# Patient Record
Sex: MALE | Race: WHITE | NOT HISPANIC OR LATINO | URBAN - METROPOLITAN AREA
[De-identification: names, ages, dates, MRNs, and addresses within clinical notes are randomized per-mention and may not be internally consistent; named-entity substitution may affect disease eponyms.]

---

## 2019-03-25 ENCOUNTER — OFFICE (OUTPATIENT)
Dept: URBAN - METROPOLITAN AREA CLINIC 11 | Facility: CLINIC | Age: 49
End: 2019-03-25

## 2019-03-25 VITALS
DIASTOLIC BLOOD PRESSURE: 82 MMHG | SYSTOLIC BLOOD PRESSURE: 121 MMHG | HEIGHT: 70 IN | WEIGHT: 202 LBS | HEART RATE: 80 BPM

## 2019-03-25 DIAGNOSIS — R16.0 HEPATOMEGALY, NOT ELSEWHERE CLASSIFIED: ICD-10-CM

## 2019-03-25 DIAGNOSIS — K74.3 PRIMARY BILIARY CIRRHOSIS: ICD-10-CM

## 2019-03-25 DIAGNOSIS — K75.4 AUTOIMMUNE HEPATITIS: ICD-10-CM

## 2019-03-25 DIAGNOSIS — R74.0 NONSPECIFIC ELEVATION OF LEVELS OF TRANSAMINASE AND LACTIC A: ICD-10-CM

## 2019-03-25 LAB
ACTIN (SMOOTH MUSCLE) ANTIBODY: 11 UNITS (ref 0–19)
AMMONIA, PLASMA: 75 UG/DL (ref 27–102)
ANTINUCLEAR ANTIBODIES, IFA: POSITIVE
CBC WITH DIFFERENTIAL/PLATELET: BASO (ABSOLUTE): 0 X10E3/UL (ref 0–0.2)
CBC WITH DIFFERENTIAL/PLATELET: BASOS: 0 %
CBC WITH DIFFERENTIAL/PLATELET: EOS (ABSOLUTE): 0.1 X10E3/UL (ref 0–0.4)
CBC WITH DIFFERENTIAL/PLATELET: EOS: 1 %
CBC WITH DIFFERENTIAL/PLATELET: HEMATOCRIT: 39.8 % (ref 37.5–51)
CBC WITH DIFFERENTIAL/PLATELET: HEMATOLOGY COMMENTS: (no result)
CBC WITH DIFFERENTIAL/PLATELET: HEMOGLOBIN: 14.6 G/DL (ref 13–17.7)
CBC WITH DIFFERENTIAL/PLATELET: IMMATURE CELLS: (no result)
CBC WITH DIFFERENTIAL/PLATELET: LYMPHS (ABSOLUTE): 0.5 X10E3/UL — LOW (ref 0.7–3.1)
CBC WITH DIFFERENTIAL/PLATELET: LYMPHS: 4 %
CBC WITH DIFFERENTIAL/PLATELET: MCH: 29.6 PG (ref 26.6–33)
CBC WITH DIFFERENTIAL/PLATELET: MCHC: 36.7 G/DL — HIGH (ref 31.5–35.7)
CBC WITH DIFFERENTIAL/PLATELET: MCV: 81 FL (ref 79–97)
CBC WITH DIFFERENTIAL/PLATELET: MONOCYTES(ABSOLUTE): 0.5 X10E3/UL (ref 0.1–0.9)
CBC WITH DIFFERENTIAL/PLATELET: MONOCYTES: 4 %
CBC WITH DIFFERENTIAL/PLATELET: NEUTROPHILS (ABSOLUTE): 10.7 X10E3/UL — HIGH (ref 1.4–7)
CBC WITH DIFFERENTIAL/PLATELET: NEUTROPHILS: 88 %
CBC WITH DIFFERENTIAL/PLATELET: PLATELETS: 147 X10E3/UL — LOW (ref 150–379)
CBC WITH DIFFERENTIAL/PLATELET: RBC: 4.94 X10E6/UL (ref 4.14–5.8)
CBC WITH DIFFERENTIAL/PLATELET: RDW: 18.6 % — HIGH (ref 12.3–15.4)
CBC WITH DIFFERENTIAL/PLATELET: WBC: 11.9 X10E3/UL — HIGH (ref 3.4–10.8)
CMV QUANT DNA PCR (PLASMA): LOG10 CMV QN DNA PL: (no result) LOG10 IU/ML
CMV QUANT DNA PCR (PLASMA): NEGATIVE IU/ML
FACTOR V ACTIVITY: 124 % (ref 70–150)
FANA STAINING PATTERNS: HOMOGENEOUS PATTERN: HIGH
FANA STAINING PATTERNS: NOTE: (no result)
FE+TIBC+FER: FERRITIN, SERUM: 1462 NG/ML — HIGH (ref 30–400)
FE+TIBC+FER: IRON BIND.CAP.(TIBC): <195 UG/DL — LOW
FE+TIBC+FER: IRON SATURATION: >91 % — CRITICAL HIGH
FE+TIBC+FER: IRON: 178 UG/DL — HIGH (ref 38–169)
FE+TIBC+FER: UIBC: <17 UG/DL — LOW
HBV REAL-TIME PCR, QUANT: HBV IU/ML: (no result) IU/ML
HBV REAL-TIME PCR, QUANT: LOG10 HBV IU/ML: (no result) LOG10 IU/ML
HBV REAL-TIME PCR, QUANT: TEST INFORMATION: (no result)
HCV RT-PCR, QUANT (NON-GRAPH): HEPATITIS C QUANTITATION: (no result) IU/ML
HCV RT-PCR, QUANT (NON-GRAPH): TEST INFORMATION: (no result)
HEP A AB, TOTAL: POSITIVE
HEPATIC FUNCTION PANEL (7): ALBUMIN: 3.2 G/DL — LOW (ref 3.5–5.5)
HEPATIC FUNCTION PANEL (7): ALKALINE PHOSPHATASE: 360 IU/L — HIGH (ref 39–117)
HEPATIC FUNCTION PANEL (7): ALT (SGPT): 123 IU/L — HIGH (ref 0–44)
HEPATIC FUNCTION PANEL (7): AST (SGOT): 81 IU/L — HIGH (ref 0–40)
HEPATIC FUNCTION PANEL (7): BILIRUBIN, DIRECT: 30.38 MG/DL — CRITICAL HIGH (ref 0–0.4)
HEPATIC FUNCTION PANEL (7): BILIRUBIN, TOTAL: 32.2 MG/DL (ref 0–1.2)
HEPATIC FUNCTION PANEL (7): PROTEIN, TOTAL: 5.2 G/DL — LOW (ref 6–8.5)
HEPATITIS E VIRUS (HEV) IGG/M: HEV IGG: NEGATIVE
HEPATITIS E VIRUS (HEV) IGG/M: HEV IGM: NEGATIVE
HSV 1/2 PCR: HSV-1 DNA: POSITIVE
HSV 1/2 PCR: HSV-2 DNA: NEGATIVE
IMMATURE CELLS: BANDS: 2 %
IMMATURE CELLS: METAMYELOCYTES: 1 % — HIGH (ref 0–0)
LIVER-KIDNEY MICROSOMAL AB: 3.2 UNITS (ref 0–20)
Lab: (no result) LOG10COPY/ML
Lab: NEGATIVE COPIES/ML
MITOCHONDRIAL (M2) ANTIBODY: <20 UNITS
PROTHROMBIN TIME (PT): INR: 1.1 (ref 0.8–1.2)
PROTHROMBIN TIME (PT): PROTHROMBIN TIME: 12.1 SEC — HIGH (ref 9.1–12)

## 2019-03-25 PROCEDURE — 99205 OFFICE O/P NEW HI 60 MIN: CPT | Performed by: INTERNAL MEDICINE

## 2019-03-25 RX ORDER — RIFAXIMIN 550 MG/1
1100 TABLET ORAL
Qty: 60 | Refills: 3 | Status: ACTIVE
Start: 2019-03-25

## 2019-03-25 NOTE — SERVICEHPINOTES
He presents for evaluation of a recently liver mass and abnormal LFTS. He has a history of Autoimmune hepatitis with featuers of PSCS and some copper deposition in 2010.  He started having dark urine in January and then became jaundiced at the end of February.  He has not had abdominal pain but has become quite icteric (bili in the upper 20s). He has not had abdominal pain, fevers or chills.  He has not had confusion issues but has been fatigued (increased over the past couple of days.  He has had some itching issues and has tried topical benadryl without much improvement. He has not been on OTC meds other than melatonin.  He has not been taking supplements, herbals, speical teas or such.He was admitted for a liver bx a few weeks ago. It was read as as cholestasis.  His MRI revealed atrophy of the left lobe of the liver and a 6cm mass in section 1. This was read as suggestive of a metastatic change.  He has a bx scheduled for Wednesday.  He has a f/u PET scan.  He had been on Chuck in 2015.  He was on prednisone for several years initially and then changed to Imuran.  His meds were stopped last year in June.  He had not been on Chuck for about three years.

## 2019-03-25 NOTE — SERVICENOTES
We reivewed his recent bx, labs, and meds. He has the hx of autoimmune hepatitis with PSCS overlap and I am quite concerned about a progression of the PSC (more than autoimmune hepatitis) given the LFT pattern.  We discussed progression to liver failure and overall risks.  I would contiue the prednisone, SHANI, and Imuran (noting risks of recurrent cancer and also progression of a cancer if present).  He will need f/u serologies and to have his liver bx over read by a liver pathologist.  With his lethargy, I will check his labs and add Xifaxan.  We discussed sx/sx of worsening liver disease and issues that would prompt an ER visit.  We also discussed possible liver transplant options/surgical options with the noted mass.

## 2019-04-01 ENCOUNTER — OFFICE (OUTPATIENT)
Dept: URBAN - METROPOLITAN AREA CLINIC 11 | Facility: CLINIC | Age: 49
End: 2019-04-01

## 2019-04-01 VITALS
HEART RATE: 90 BPM | HEIGHT: 70 IN | WEIGHT: 198 LBS | DIASTOLIC BLOOD PRESSURE: 75 MMHG | SYSTOLIC BLOOD PRESSURE: 112 MMHG

## 2019-04-01 DIAGNOSIS — K83.01 PRIMARY SCLEROSING CHOLANGITIS: ICD-10-CM

## 2019-04-01 DIAGNOSIS — K75.4 AUTOIMMUNE HEPATITIS: ICD-10-CM

## 2019-04-01 DIAGNOSIS — C22.1 INTRAHEPATIC BILE DUCT CARCINOMA: ICD-10-CM

## 2019-04-01 DIAGNOSIS — E83.110 HEREDITARY HEMOCHROMATOSIS: ICD-10-CM

## 2019-04-01 LAB
COMP. METABOLIC PANEL (14): A/G RATIO: 1.5 (ref 1.2–2.2)
COMP. METABOLIC PANEL (14): ALBUMIN: 3.1 G/DL — LOW (ref 3.5–5.5)
COMP. METABOLIC PANEL (14): ALKALINE PHOSPHATASE: 372 IU/L — HIGH (ref 39–117)
COMP. METABOLIC PANEL (14): ALT (SGPT): 150 IU/L — HIGH (ref 0–44)
COMP. METABOLIC PANEL (14): AST (SGOT): 81 IU/L — HIGH (ref 0–40)
COMP. METABOLIC PANEL (14): BILIRUBIN, TOTAL: 31.4 MG/DL (ref 0–1.2)
COMP. METABOLIC PANEL (14): BUN/CREATININE RATIO: 26 — HIGH (ref 9–20)
COMP. METABOLIC PANEL (14): BUN: 49 MG/DL — HIGH (ref 6–24)
COMP. METABOLIC PANEL (14): CALCIUM: 9.4 MG/DL (ref 8.7–10.2)
COMP. METABOLIC PANEL (14): CARBON DIOXIDE, TOTAL: 25 MMOL/L (ref 20–29)
COMP. METABOLIC PANEL (14): CHLORIDE: 101 MMOL/L (ref 96–106)
COMP. METABOLIC PANEL (14): CREATININE: 1.91 MG/DL — HIGH (ref 0.76–1.27)
COMP. METABOLIC PANEL (14): EGFR IF AFRICN AM: 47 ML/MIN/1.73 — LOW (ref 59–?)
COMP. METABOLIC PANEL (14): EGFR IF NONAFRICN AM: 40 ML/MIN/1.73 — LOW (ref 59–?)
COMP. METABOLIC PANEL (14): GLOBULIN, TOTAL: 2.1 G/DL (ref 1.5–4.5)
COMP. METABOLIC PANEL (14): GLUCOSE: 127 MG/DL — HIGH (ref 65–99)
COMP. METABOLIC PANEL (14): POTASSIUM: 4.7 MMOL/L (ref 3.5–5.2)
COMP. METABOLIC PANEL (14): PROTEIN, TOTAL: 5.2 G/DL — LOW (ref 6–8.5)
COMP. METABOLIC PANEL (14): SODIUM: 144 MMOL/L (ref 134–144)
PROTHROMBIN TIME (PT): INR: 1.2 (ref 0.8–1.2)
PROTHROMBIN TIME (PT): PROTHROMBIN TIME: 13.2 SEC — HIGH (ref 9.1–12)

## 2019-04-01 PROCEDURE — 99215 OFFICE O/P EST HI 40 MIN: CPT | Performed by: INTERNAL MEDICINE

## 2019-04-01 RX ORDER — HYDROXYZINE HYDROCHLORIDE 25 MG/1
TABLET, FILM COATED ORAL
Qty: 60 | Refills: 3 | Status: ACTIVE
Start: 2019-04-01

## 2019-04-01 NOTE — SERVICENOTES
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  We discussed the difficulties with treating his hepatitis with immunologic meds/steroids with his cancers (colon and cholangio) but that there is not another good option.  He is scheduled for surgery on Wednesday with [facility]-Tranplant but is not currently a transplant candidate due to the size of his tumor.  We discussed that his MELD score is 24 and that over time this is predictive of significant morbidity and mortality risks over the next 3 months.  His synthectic function has been stable thus far (normal factor V level/INR) but his albumin and total protien are just below normal (d/w pt nutrional issues).  Overall, his prognosis is guarded even if the surgery is successful.  D/w pt advanced directives, which he now has.

## 2019-04-01 NOTE — SERVICEHPINOTES
He presents for f/u hepatitis and after his liver bx.  He has continue to have itching and icterus.  He has been tired but has not had confusion issues.  He has not had abdominal pain, f/c, or n/v.  No issues with melena. He has had a decrease in his appetite but is pushing his diet.  His itching has continue to be an issue and though the benadryl helps it also is causing issues with feeling hot.  He has been consistent with his meds.    He has had some difficulties with constipation and has started Miralax which helps.  He has not had issues with lower GI bleeding.     We discussed his recent labs in detail and reviewed his liver bx.His case was d/w Dr. Mclaughlin and his old bx were reviewed.  AFSHAN

## 2019-04-24 ENCOUNTER — INPATIENT HOSPITAL (OUTPATIENT)
Dept: URBAN - METROPOLITAN AREA HOSPITAL 130 | Facility: HOSPITAL | Age: 49
End: 2019-04-24
Payer: COMMERCIAL

## 2019-04-24 DIAGNOSIS — K83.1 OBSTRUCTION OF BILE DUCT: ICD-10-CM

## 2019-04-24 DIAGNOSIS — C78.7 SECONDARY MALIGNANT NEOPLASM OF LIVER AND INTRAHEPATIC BILE: ICD-10-CM

## 2019-04-24 PROCEDURE — 99253 IP/OBS CNSLTJ NEW/EST LOW 45: CPT
